# Patient Record
Sex: FEMALE
[De-identification: names, ages, dates, MRNs, and addresses within clinical notes are randomized per-mention and may not be internally consistent; named-entity substitution may affect disease eponyms.]

---

## 2021-11-18 ENCOUNTER — HOSPITAL ENCOUNTER (OUTPATIENT)
Dept: HOSPITAL 97 - ER | Age: 61
Setting detail: OBSERVATION
LOS: 1 days | Discharge: HOME | End: 2021-11-19
Attending: HOSPITALIST | Admitting: HOSPITALIST
Payer: SELF-PAY

## 2021-11-18 VITALS — BODY MASS INDEX: 29.9 KG/M2

## 2021-11-18 DIAGNOSIS — R18.8: Primary | ICD-10-CM

## 2021-11-18 DIAGNOSIS — Z20.822: ICD-10-CM

## 2021-11-18 DIAGNOSIS — R16.0: ICD-10-CM

## 2021-11-18 DIAGNOSIS — B18.2: ICD-10-CM

## 2021-11-18 DIAGNOSIS — I10: ICD-10-CM

## 2021-11-18 LAB
HCT VFR BLD CALC: 38.1 % (ref 36–45)
INR BLD: 1.3
LYMPHOCYTES # SPEC AUTO: 0.7 K/UL (ref 0.7–4.9)
PMV BLD: 11.1 FL (ref 7.6–11.3)
RBC # BLD: 4.09 M/UL (ref 3.86–4.86)

## 2021-11-18 PROCEDURE — 85025 COMPLETE CBC W/AUTO DIFF WBC: CPT

## 2021-11-18 PROCEDURE — 96374 THER/PROPH/DIAG INJ IV PUSH: CPT

## 2021-11-18 PROCEDURE — 74177 CT ABD & PELVIS W/CONTRAST: CPT

## 2021-11-18 PROCEDURE — 99285 EMERGENCY DEPT VISIT HI MDM: CPT

## 2021-11-18 PROCEDURE — 36415 COLL VENOUS BLD VENIPUNCTURE: CPT

## 2021-11-18 PROCEDURE — 80076 HEPATIC FUNCTION PANEL: CPT

## 2021-11-18 PROCEDURE — 89050 BODY FLUID CELL COUNT: CPT

## 2021-11-18 PROCEDURE — 83880 ASSAY OF NATRIURETIC PEPTIDE: CPT

## 2021-11-18 PROCEDURE — 71045 X-RAY EXAM CHEST 1 VIEW: CPT

## 2021-11-18 PROCEDURE — 84484 ASSAY OF TROPONIN QUANT: CPT

## 2021-11-18 PROCEDURE — 88162 CYTOPATH SMEAR OTHER SOURCE: CPT

## 2021-11-18 PROCEDURE — 84439 ASSAY OF FREE THYROXINE: CPT

## 2021-11-18 PROCEDURE — 80048 BASIC METABOLIC PNL TOTAL CA: CPT

## 2021-11-18 PROCEDURE — 93970 EXTREMITY STUDY: CPT

## 2021-11-18 PROCEDURE — 83615 LACTATE (LD) (LDH) ENZYME: CPT

## 2021-11-18 PROCEDURE — 82945 GLUCOSE OTHER FLUID: CPT

## 2021-11-18 PROCEDURE — 84157 ASSAY OF PROTEIN OTHER: CPT

## 2021-11-18 PROCEDURE — 96375 TX/PRO/DX INJ NEW DRUG ADDON: CPT

## 2021-11-18 PROCEDURE — 93005 ELECTROCARDIOGRAM TRACING: CPT

## 2021-11-18 PROCEDURE — 49083 ABD PARACENTESIS W/IMAGING: CPT

## 2021-11-18 PROCEDURE — 80061 LIPID PANEL: CPT

## 2021-11-18 PROCEDURE — 84478 ASSAY OF TRIGLYCERIDES: CPT

## 2021-11-18 PROCEDURE — 84443 ASSAY THYROID STIM HORMONE: CPT

## 2021-11-18 PROCEDURE — 82140 ASSAY OF AMMONIA: CPT

## 2021-11-18 PROCEDURE — 85730 THROMBOPLASTIN TIME PARTIAL: CPT

## 2021-11-18 PROCEDURE — 87070 CULTURE OTHR SPECIMN AEROBIC: CPT

## 2021-11-18 PROCEDURE — 85610 PROTHROMBIN TIME: CPT

## 2021-11-18 PROCEDURE — 83735 ASSAY OF MAGNESIUM: CPT

## 2021-11-18 PROCEDURE — 74183 MRI ABD W/O CNTR FLWD CNTR: CPT

## 2021-11-19 VITALS — TEMPERATURE: 97.9 F | DIASTOLIC BLOOD PRESSURE: 58 MMHG | SYSTOLIC BLOOD PRESSURE: 120 MMHG

## 2021-11-19 VITALS — OXYGEN SATURATION: 96 %

## 2021-11-19 LAB
ALBUMIN SERPL BCP-MCNC: 2.1 G/DL (ref 3.4–5)
ALP SERPL-CCNC: 134 U/L (ref 45–117)
ALT SERPL W P-5'-P-CCNC: 67 U/L (ref 12–78)
AST SERPL W P-5'-P-CCNC: 88 U/L (ref 15–37)
BUN BLD-MCNC: 22 MG/DL (ref 7–18)
GLUCOSE SERPLBLD-MCNC: 101 MG/DL (ref 74–106)
HDLC SERPL-MCNC: 29 MG/DL (ref 40–60)
LDLC SERPL CALC-MCNC: 75 MG/DL (ref ?–130)
MAGNESIUM SERPL-MCNC: 2 MG/DL (ref 1.8–2.4)
NT-PROBNP SERPL-MCNC: 232 PG/ML (ref ?–125)
POTASSIUM SERPL-SCNC: 3.5 MMOL/L (ref 3.5–5.1)
TROPONIN (EMERG DEPT USE ONLY): < 0.02 NG/ML (ref 0–0.04)
TSH SERPL DL<=0.05 MIU/L-ACNC: 2.29 UIU/ML (ref 0.36–3.74)

## 2021-11-19 PROCEDURE — 0W9G3ZX DRAINAGE OF PERITONEAL CAVITY, PERCUTANEOUS APPROACH, DIAGNOSTIC: ICD-10-PCS

## 2021-11-19 NOTE — RAD REPORT
EXAM DESCRIPTION:  US - Paracentesis Proc Guidance - 11/19/2021 8:29 am

 

CLINICAL HISTORY:  Significant ascites, Hep C

Ascites

 

COMPARISON:  No comparisons

 

FINDINGS:  Informed consent was obtained and time-out was performed.

 

Patient's abdomen was prepped and draped in the usual sterile fashion. 1% lidocaine was used for loca
l anesthetic purposes.

 

A small skin incision was made. A paracentesis catheter was guided into the peroneal cavity under son
ographic guidance.

 

A small amount of fluid was sent for requested lab studies. A large volume paracentesis was performed
.

 

The patient tolerated the procedure well.

 

Patient was administered IV albumin per protocol following the procedure.

 

IMPRESSION:  Successful ultrasound-guided paracentesis.

## 2021-11-19 NOTE — P.HP
Certification for Inpatient


Patient admitted to: Observation


With expected LOS: <2 Midnights


Patient will require the following post-hospital care: None


Practitioner: I am a practitioner with admitting privileges, knowledge of 

patient current condition, hospital course, and medical plan of care.


Services: Services provided to patient in accordance with Admission requirements

found in Title 42 Section 412.3 of the Code of Federal Regulations





Patient History


Date of Service: 21


Primary Care Provider: KeaauMannie Ramsay


Reason for admission: Ascites


History of Present Illness: 


61-year-old  female with history of hepatitis C, hypertension presents 

emergency department for abdominal swelling.  Patient reports to diagnosed with 

hepatitis C approximately 2 years ago, has noticed increased welling to the 

abdomen over the course of last 6 months, daughter reports significant worsening

over the course of last few days.  Patient noted to have severe abdominal 

swelling, moderately tense ascites with some dyspnea related.  Patient was 

evaluated in the emergency department labs were significant for chloride 108 GFR

60 9T bili 1.4D bili 0.8 AST 88 alk phos 134  albumin 2.1.  CT abdomen 

pelvis demonstrated massive ascites patient to at least for underlying hepatic 

masses the largest of which inferiorly measures 4.3 cm, no biliary dilatation.  

ED provider wishes to admit for further evaluation and management/paracentesis.








- Past Medical/Surgical History


-: Hepatitis C


-: Hypertension


-: Cholecystectomy


-: 


Psychosocial/ Personal History: Patient lives at home with her son





- Family History


  ** Mother


-: Heart disease





- Social History


Smoking Status: Never smoker


Counseled patient to stop smoking for: less than 10 minutes


Alcohol use: No


CD- Drugs: No


Caffeine use: Yes


Place of Residence: Home





Review of Systems


Respiratory: Shortness of Breath


Gastrointestinal: Abdominal Pain, Distention





Physical Examination





- Physical Exam


General: Alert, In no apparent distress, Oriented x3


HEENT: Atraumatic


Neck: Supple


Respiratory: Clear to auscultation bilaterally


Cardiovascular: No edema, Normal S1 S2


Capillary refill: <2 Seconds


Gastrointestinal: Hypoactive, Distended, Ascites


Musculoskeletal: No contractures, No erythema


Integumentary: No significant lesion, No tenderness/swelling, No erythema


Neurological: Normal speech, Normal strength at 5/5 x4 extr, Normal tone





- Studies


Laboratory Data (last 24 hrs)





21 23:26: PT 15.0 H, INR 1.30, APTT 30.0


21 23:26: WBC 6.20, Hgb 12.4, Hct 38.1, Plt Count 152


21 23:26: Sodium 138, Potassium 3.5, BUN 22 H, Creatinine 0.84, Glucose 

101, Magnesium 2.0, Total Bilirubin 1.4 H, AST 88 H, ALT 67, Alkaline 

Phosphatase 134 H








Assessment and Plan





- Plan


Assessment:


Abdominal pain, significant abdominal ascites secondary to chronic hepatitis C


Hypertension





Plan:


Abdominal pain, significant abdominal ascites secondary to chronic hepatitis C 

with newly found hepatic masses: Continue medications including prolactin, 

lactulose, furosemide.  Will obtain ammonia level.  Will attempt to arrange for 

ultrasound-guided abdominal paracentesis with fluid analysis including cell 

count, cytology, culture and sensitivity, LDH, glucose, triglycerides, Gram 

stain.  We will also administer albumin 25% 10 g/L removed.  MRI also ordered to

further evaluate hepatic masses.  Patient counseled on need for follow-up with 

GI/hepatology on outpatient basis for further care.


Hypertension: Obtain and continue home medications.





DVT PPX: SCDs


Code status: Full code


Discharge Plan: Home


Plan to discharge in: 24 Hours





- Advance Directives


Does patient have a Living Will: No


Does patient have a Durable POA for Healthcare: No





- Code Status/Comfort Care


Code Status Assessed: Yes (Full code )


Critical Care: No


Time Spent Managing Pts Care (In Minutes): 55

## 2021-11-19 NOTE — EDPHYS
Physician Documentation                                                                           

 [facility]                                                                 

Name: Jocelynn Mathias                                                                                

Age: 61 yrs                                                                                       

Sex: Female                                                                                       

: 1960                                                                                   

MRN: [id number]                                                                                   

Arrival Date: 2021                                                                          

Time: 21:25                                                                                       

Account#: [id number]                                                                            

Bed 13                                                                                            

Private MD:                                                                                       

ED Physician Filippo Lyman                                                                      

HPI:                                                                                              

                                                                                             

23:20 This 61 yrs old Female presents to ER via Wheelchair with complaints of Abdominal       cp  

      Swelling, Abdominal Pain.                                                                   

23:20 The patient presents with abdominal pain that is diffuse, abdominal distention that is  cp  

      diffuse.                                                                                    

23:20 Onset: The symptoms/episode began/occurred 2 year(s) ago, and became worse 2 day(s) ago.cp  

23:20 The symptoms do not radiate. Associated signs and symptoms: Pertinent positives:        cp  

      shortness of breath, swelling of lower legs, Pertinent negatives: chest pain,               

      constipation, diarrhea, fever, vomiting. The symptoms are described as constant.            

                                                                                                  

Historical:                                                                                       

- Allergies:                                                                                      

21:57 No Known Allergies;                                                                     ld1 

- Home Meds:                                                                                      

                                                                                             

00:15 Potassium Chloride Oral [Active]; Hydralazine Oral [Active]; Furosemide Oral [Active];  vg1 

      Spironolactone Oral [Active];                                                               

- PMHx:                                                                                           

                                                                                             

21:57 hepatitis; Hypertensive disorder;                                                       ld1 

- PSHx:                                                                                           

21:57  section; Cholecystectomy;                                                      ld1 

                                                                                                  

- Immunization history:: Adult Immunizations up to date, Client reports receiving the             

  2nd dose of the Covid vaccine.                                                                  

- Social history:: Smoking status: Patient denies any tobacco usage or history of.                

  Patient/guardian denies using alcohol.                                                          

                                                                                                  

                                                                                                  

ROS:                                                                                              

23:30 Constitutional: Negative for body aches, chills, fever, poor PO intake.                 cp  

23:30 Eyes: Negative for injury, pain, redness, and discharge.                                cp  

23:30 ENT: Negative for ear pain, sore throat, difficulty swallowing, difficulty handling         

      secretions.                                                                                 

23:30 Cardiovascular: Positive for edema, Negative for chest pain.                                

23:30 Respiratory: Negative for cough, shortness of breath, wheezing.                             

23:30 Abdomen/GI: Positive for abdominal pain, abdominal distension, Negative for diarrhea,       

      constipation, black/tarry stool, rectal bleeding.                                           

23:30 Back: Negative for injury or acute deformity.                                               

23:30 Skin: Negative for cellulitis, rash.                                                        

23:30 Neuro: Negative for altered mental status, headache, weakness.                              

23:30 All other systems are negative.                                                             

                                                                                                  

Exam:                                                                                             

23:33 Constitutional: The patient appears in no acute distress, alert, awake,                 cp  

      non-diaphoretic, well developed, well nourished, in obvious pain, uncomfortable.            

23:33 Head/Face:  Normocephalic, atraumatic.                                                  cp  

23:33 Eyes: Periorbital structures: appear normal, Conjunctiva: normal, no exudate, no            

      injection, Sclera: no appreciated abnormality, Lids and lashes: appear normal,              

      bilaterally.                                                                                

23:33 ENT: External ear(s): are unremarkable, Nose: is normal, Mouth: Lips: moist, Oral           

      mucosa: moist, Posterior pharynx: Airway: no evidence of obstruction, patent.               

23:33 Neck: ROM/movement: is normal, is supple, without pain, no range of motions                 

      limitations, no nuchal rigidity.                                                            

23:33 Chest/axilla: Inspection: normal, Palpation: is normal, no crepitus, no tenderness.         

23:33 Cardiovascular: Rate: tachycardic, Rhythm: regular, Edema: ankle edema, that is             

      moderate, JVD: is not appreciated.                                                          

23:33 Respiratory: the patient does not display signs of respiratory distress,  Respirations:     

      labored breathing, is not present, shallow respirations, that is mild, Breath sounds:       

      are clear throughout, no decreased breath sounds, no stridor, no wheezing.                  

23:33 Abdomen/GI: Inspection: distension, that is severe, in the abdomen diffusely, Bowel         

      sounds: active, all quadrants, Palpation: soft, in all quadrants, severe abdominal          

      tenderness, in all quadrants, rebound tenderness, is not appreciated, voluntary             

      guarding, is elicited in all quadrants, Hernia: noted in the  umbilical area,               

      incarceration, is not appreciated.                                                          

23:33 Back: CVA tenderness, is absent.                                                            

23:33 Skin: cellulitis, is not appreciated, no rash present.                                      

23:33 Neuro: Orientation: to person, place \T\ time. Mentation: is normal, Motor: moves all       

      fours, strength is normal, Sensation: is normal.                                            

23:40 ECG was reviewed by the Attending Physician.                                            cp  

                                                                                                  

Vital Signs:                                                                                      

21:54  / 86; Pulse 108; Resp 20; Temp 98.6(TE); Pulse Ox 99% on R/A; Weight 79.38 kg;   ld1 

      Height 5 ft. 4 in. (162.56 cm); Pain 10/10;                                                 

23:19  / 82; Pulse 107; Resp 24; Pulse Ox 100% ;                                        vg1 

                                                                                             

00:00  / 78; Pulse 99; Resp 20; Pulse Ox 100% ;                                         fu  

01:15  / 75; Pulse 102; Resp 22; Pulse Ox 100% on R/A;                                  fu  

02:15  / 78; Pulse 104; Resp 20; Pulse Ox 98% on R/A; Pain 7/10;                        fu  

03:45  / 79; Pulse 106; Resp 20; Temp 98.3; Pulse Ox 99% on R/A; Pain 6/10;             fu  

                                                                                             

21:54 Body Mass Index 30.04 (79.38 kg, 162.56 cm)                                             ld1 

                                                                                                  

MDM:                                                                                              

                                                                                             

23:14 Patient medically screened.                                                               

                                                                                             

01:50 Data reviewed: vital signs, nurses notes, lab test result(s), EKG, radiologic studies,  cp  

      CT scan, plain films, ultrasound.                                                           

01:50 Test interpretation: by ED physician or midlevel provider: ECG, plain radiologic        cp  

      studies. Counseling: I had a detailed discussion with the patient and/or guardian           

      regarding: the historical points, exam findings, and any diagnostic results supporting      

      the discharge/admit diagnosis, lab results, radiology results, the need for further         

      work-up and treatment in the hospital. Response to treatment: the patient's symptoms        

      have mildly improved after treatment, and as a result, I will admit patient. Physician      

      consultation: El Rodrigues was called at 01:50, was contacted at 01:50, regarding             

      admission, to the telemetry unit. patient's condition, and will see patient in ED,          

      shortly.                                                                                    

                                                                                                  

                                                                                             

23:15 Order name: Basic Metabolic Panel; Complete Time: 00:05                                 cp  

                                                                                             

00:05 Interpretation: Normal except: ; BUN 22; GFR 69.                                    

                                                                                             

23:15 Order name: CBC with Diff; Complete Time: 00:05                                         cp  

                                                                                             

00:05 Interpretation: Normal except: YE% 76.2; LYM% 11.4.                                    cp  

                                                                                             

23:15 Order name: LFT's; Complete Time: 00:05                                                   

                                                                                             

03:17 Interpretation: Normal except: AST 88; ; BILIT 1.4; BILID 0.8; TP 8.7; ALB 2.1;  cp  

      GLOB 6.6; A/G 0.3.                                                                          

                                                                                             

23:15 Order name: Magnesium; Complete Time: 00:05                                             cp  

                                                                                             

23:15 Order name: NT PRO-BNP; Complete Time: 00:05                                              

                                                                                             

23:15 Order name: PT-INR; Complete Time: 00:05                                                  

                                                                                             

23:15 Order name: Troponin (emerg Dept Use Only); Complete Time: 00:05                          

                                                                                             

23:15 Order name: XRAY Chest (1 view)                                                           

                                                                                             

23:15 Order name: Ptt, Activated; Complete Time: 00:05                                          

                                                                                             

00:08 Order name: CT Abd/Pelvis - IV Contrast Only                                              

                                                                                             

00:11 Order name: US Extremity Venous W Compression Nick                                       cp  

                                                                                             

01:07 Order name: COVID-19 (Coronavirus) Document "Date of Onset" if Symptomatic                

                                                                                             

01:50 Order name: SARS-COV-2 RT PCR; Complete Time: 03:16                                     EDMS

                                                                                             

23:15 Order name: EKG; Complete Time: 23:16                                                   cp  

                                                                                             

23:15 Order name: Cardiac monitoring; Complete Time: 23:44                                      

                                                                                             

23:15 Order name: EKG - Nurse/Tech; Complete Time: 23:44                                      cp  

                                                                                             

23:15 Order name: IV Saline Lock; Complete Time: 23:26                                        cp  

                                                                                             

23:15 Order name: Labs collected and sent; Complete Time: 23:26                                 

                                                                                             

23:15 Order name: O2 Per Protocol; Complete Time: 23:21                                         

                                                                                             

23:15 Order name: O2 Sat Monitoring; Complete Time: 23:21                                     cp  

                                                                                                  

EC/18                                                                                             

23:40 Rate is 106 beats/min. Rhythm is regular. MS interval is normal. QRS interval is        cp  

      normal. QT interval is normal. Interpreted by me. Reviewed by me.                           

                                                                                                  

Administered Medications:                                                                         

23:40 Drug: Zofran (Ondansetron) 4 mg Route: IVP; Site: right antecubital;                    vg1 

                                                                                             

01:05 Follow up: Response: No adverse reaction                                                  

                                                                                             

23:42 Drug: morphine 4 mg {Note: rass 1.} Route: IVP; Site: right antecubital;                vg1 

                                                                                             

00:30 Follow up: Response: Pain is decreased                                                  fu  

01:26 Drug: Lasix (furosemide) 40 mg Route: IVP; Site: right antecubital;                     fu  

02:49 Follow up: Urine output 800 ml                                                          fu  

                                                                                                  

                                                                                                  

Disposition:                                                                                      

06:18 Co-signature as Attending Physician, Filippo Lyman MD.                                 [id number] 

                                                                                                  

Disposition Summary:                                                                              

21 01:57                                                                                    

Hospitalization Ordered                                                                           

      Hospitalization Status: Observation                                                     cp  

      Provider: Ash Weiss cp  

      Location: Telemetry/MedSurg (observation)                                               cp  

      Condition: Stable                                                                       cp  

      Problem: an ongoing problem                                                             cp  

      Symptoms: have improved                                                                 cp  

      Bed/Room Type: Standard                                                                 cp  

      Room Assignment: 208(21 03:41)                                                    eb1 

      Diagnosis                                                                                   

        - Unspecified cirrhosis of liver                                                      cp  

        - Abdominal pain, unspecified                                                         cp  

      Forms:                                                                                      

        - Medication Reconciliation Form                                                      cp  

        - SBAR form                                                                           cp  

Signatures:                                                                                       

Dispatcher [employer]                           EDMS                                                 

Vinny Mosley PA PA   cp                                                   

Max Nash RN                      [facility]                                                      

Glory Rodriguez RN                     RN   [facility]                                                  

Latrice Radford RN RN   [facility]                                                  

Filippo Lyman MD MD   [facility]                                                  

Claudia Peralta RN                     RN   1                                                  

                                                                                                  

Corrections: (The following items were deleted from the chart)                                    

00:16  21:57 Home Meds: None; ld1                                                        1 

                                                                                             

01:50 01:08 CORONAVIRUS ordered. EDMS                                                         EDMS

03:41 01:57 cp                                                                                eb1 

                                                                                                  

**************************************************************************************************

## 2021-11-19 NOTE — RAD REPORT
EXAM DESCRIPTION:  MRI - Abdomen WWo Cont - 11/19/2021 8:14 am

 

CLINICAL HISTORY:  Eval liver masses

 

COMPARISON:  Abdomen   Pelvis W Contrast dated 11/19/2021

 

FINDINGS:  Cirrhotic liver morphology. There are several lesions in the liver which are intrinsically
 T1 hyperintense, hypervascular, and washout consistent which is concerning for the presence of multi
focal hepatocellular carcinoma. The largest lesion is in the inferior right hepatic lobe measuring 4.
1 cm. The T2 sequences are limited and is difficult to tell if T2 hyperintense. There is a mildly exo
phytic lesion in the superior and lateral aspect of segment 2 of the liver measuring 2.9 cm. There is
 a lesion in the dome measuring 1.8 cm which washes out and is suspicious. In the posterior aspect of
 segment 5, there is a 2.7 and 2.4 cm lesion which washes out. The portal vein is patent.

 

Large volume of ascites. No pancreatic masses identified. Spleen is within normal limits. No focal re
nal masses are seen. Cholecystectomy.

 

IMPRESSION:  At least 5 lesions are present in the liver including the left and right hepatic lobes t
hat are suspicious for multifocal/multicentric hepatocellular carcinoma. The largest lesion measures 
4.1 cm.

## 2021-11-19 NOTE — EKG
Test Date:    2021-11-18               Test Time:    23:34:42

Technician:   LOVE                                    

                                                     

MEASUREMENT RESULTS:                                       

Intervals:                                           

Rate:         106                                    

ID:           [id number]                                    

QRSD:         96                                     

QT:           338                                    

QTc:          448                                    

Axis:                                                

P:            38                                     

ID:           [id number]                                    

QRS:          7                                      

T:            123                                    

                                                     

INTERPRETIVE STATEMENTS:                                       

                                                     

Sinus tachycardia with premature atrial complexes

Left ventricular hypertrophy with repolarization abnormality

Abnormal ECG

No previous ECG available for comparison



Electronically Signed On 11-19-21 13:16:06 CST by Cain Person

## 2021-11-19 NOTE — RAD REPORT
EXAM DESCRIPTION:  CT - Abdomen   Pelvis W Contrast - 11/19/2021 6:25 am

 

CLINICAL HISTORY:  Abdominal distention;Abd painCOMPARISON:  None.

 

TECHNIQUE:  CT ABDOMEN PELVIS WITH IV CONTRAST on 11/19/2021 12:08 AM CST This exam was performed acc
ording to our departmental dose-optimization program, which includes automated exposure control, adju
stment of the mA and/or kV according to patient size and/or use of iterative reconstruction technique
.

 

FINDINGS:  There is mostly right basilar atelectasis.Abdomen: Liver is severely cirrhotic containing 
at least four masses within the right lobe, the largest of which inferiorly measures 4.3 cm. There is
 no biliary dilatation. Cholecystectomy was performed. There is massive ascites. The pancreas and spl
een are normal in appearance. The adrenal glands and kidneys are unremarkable. Abdominal aorta is mod
erately calcified without aneurysm. There is no free air. There is no retroperitoneal adenopathy. The
re is a large fluid containing umbilical hernia. There is diffuse body wall anasarca.Pelvis: There is
 no bowel obstruction. Urinary bladder is unremarkable. There is large amount of free pelvic fluid. U
terus is normal in size. Appendix is normal.Skeleton: There are no acute osseous findings. No suspici
ous bony lesions.

 

IMPRESSION:  Severe hepatic cirrhosis with at least four underlying hepatic masses, which should be f
urther assessed with dedicated MRI. Severe ascites.

 

Electronically signed by:   Alexandre Pinon MD   11/19/2021 1:51 AM CST Workstation: [id number]

 

 

Due to temporary technical issues with the PACS/Fluency reporting system, reports are being signed by
 the in house radiologist without review as a courtesy to ensure prompt reporting. The interpreting r
adiologist is fully responsible for the content of the report.

## 2021-11-19 NOTE — RAD REPORT
EXAM DESCRIPTION:  US - Extrem Venous W Compress Nick - 11/19/2021 12:50 am

 

CLINICAL HISTORY:  Bilateral leg DVT

 

COMPARISON:  None.

 

TECHNIQUE:  Real-time sonographic evaluation of the bilateral lower extremity deep venous systems was
 performed.

 

FINDINGS:  Normal compressibility, flow augmentation, phasic flow and spontaneous flow is identified 
in both the left and right lower extremity deep venous systems. No intraluminal filling defects seen.


 

IMPRESSION:  No DVT in either lower extremity.

## 2021-11-19 NOTE — ER
Nurse's Notes                                                                                     

 [facility]                                                                 

Name: Jocelynn Mathias                                                                                

Age: 61 yrs                                                                                       

Sex: Female                                                                                       

: 1960                                                                                   

MRN: [id number]                                                                                   

Arrival Date: 2021                                                                          

Time: 21:25                                                                                       

Account#: [id number]                                                                            

Bed 13                                                                                            

Private MD:                                                                                       

Diagnosis: Unspecified cirrhosis of liver;Abdominal pain, unspecified                             

                                                                                                  

Presentation:                                                                                     

                                                                                             

21:54 Chief complaint: Patient states: My stomach is swelling really bad, This began about 2  ld1 

      years ago. Pt reports it has something to do with her liver. Swelling increasing            

      dramatically over the past two days. Coronavirus screen: At this time, the client does      

      not indicate any symptoms associated with coronavirus-19. Ebola Screen: No symptoms or      

      risks identified at this time. Initial Sepsis Screen: Does the patient meet any 2           

      criteria? No. Patient's initial sepsis screen is negative. Does the patient have a          

      suspected source of infection? No. Patient's initial sepsis screen is negative. Risk        

      Assessment: Do you want to hurt yourself or someone else? Patient reports no desire to      

      harm self or others. Onset of symptoms was 2021 at 21:57.                      

21:54 Method Of Arrival: Wheelchair                                                           ld1 

21:54 Acuity: CALEB 3                                                                           ld1 

                                                                                                  

Triage Assessment:                                                                                

21:57 General: Appears in no apparent distress. uncomfortable, Behavior is calm, cooperative, ld1 

      appropriate for age. Pain: Complains of pain in abdomen Pain does not radiate. Pain         

      currently is 10 out of 10 on a pain scale. Quality of pain is described as throbbing,       

      Pain began suddenly, Is continuous. Neuro: Level of Consciousness is awake, alert,          

      obeys commands, Oriented to person, place, time, situation, Appropriate for age.            

      Cardiovascular: Capillary refill < 3 seconds Patient's skin is warm and dry.                

      Respiratory: Airway is patent Respiratory effort is even, unlabored, Respiratory            

      pattern is regular, symmetrical. GI: Abdomen is round distended, Reports lower              

      abdominal pain, upper abdominal pain, bloating. : No signs and/or symptoms were           

      reported regarding the genitourinary system. Derm: No signs and/or symptoms reported        

      regarding the dermatologic system. Derm: No signs and/or symptoms reported regarding        

      the dermatologic system.                                                                    

                                                                                                  

Historical:                                                                                       

- Allergies:                                                                                      

:57 No Known Allergies;                                                                     ld1 

- Home Meds:                                                                                      

                                                                                             

00:15 Potassium Chloride Oral [Active]; Hydralazine Oral [Active]; Furosemide Oral [Active];  vg1 

      Spironolactone Oral [Active];                                                               

- PMHx:                                                                                           

                                                                                             

21:57 hepatitis; Hypertensive disorder;                                                       ld1 

- PSHx:                                                                                           

21:57  section; Cholecystectomy;                                                      ld1 

                                                                                                  

- Immunization history:: Adult Immunizations up to date, Client reports receiving the             

  2nd dose of the Covid vaccine.                                                                  

- Social history:: Smoking status: Patient denies any tobacco usage or history of.                

  Patient/guardian denies using alcohol.                                                          

                                                                                                  

                                                                                                  

Screenin:19 Abuse screen: Denies threats or abuse. Nutritional screening: No deficits noted.        vg1 

      Tuberculosis screening: No symptoms or risk factors identified. Fall Risk No fall in        

      past 12 months (0 pts). No secondary diagnosis (0 pts). IV access (20 points).              

      Ambulatory Aid- None/Bed Rest/Nurse Assist (0 pts). Gait- Normal/Bed Rest/Wheelchair (0     

      pts) Mental Status- Oriented to own ability (0 pts). Total Quezada Fall Scale indicates       

      No Risk (0-24 pts).                                                                         

                                                                                                  

Assessment:                                                                                       

23:15 General: Appears in no apparent distress. uncomfortable, Behavior is calm, cooperative. vg1 

      Pain: Complains of pain in abdomen Pain currently is 10 out of 10 on a pain scale. Pain     

      began 2-3 days ago. Neuro: Level of Consciousness is awake, alert, obeys commands,          

      Oriented to person, place, time, situation. Cardiovascular: Patient's skin is warm and      

      dry. Respiratory: Airway is patent Respiratory effort is even, labored, Respiratory         

      pattern is tachypnea. GI: Abdomen is round distended, noted to have ascites, Bowel          

      sounds hypoactive in right upper quadrant, left upper quadrant, right lower quadrant        

      and left lower quadrant Abd is non tender X 4 quads Patient currently denies diarrhea,      

      nausea, vomiting. : No signs and/or symptoms were reported regarding the                  

      genitourinary system. EENT: No signs and/or symptoms were reported regarding the EENT       

      system. Derm: Skin is intact. Musculoskeletal: Circulation, motion, and sensation           

      intact. Swelling present in right leg.                                                      

23:37 Reassessment: Received VO from Melany MAN to administer 4 mg of Zofran IVP x1 and Morphine vg1 

      4 mg IVP x1.                                                                                

                                                                                             

00:24 Reassessment: Patient and/or family updated on plan of care and expected duration. Pain fu  

      level reassessed. Patient is alert, oriented x 3, equal unlabored respirations, skin        

      warm/dry/pink.                                                                              

01:00 Reassessment: Patient is alert, oriented x 3, equal unlabored respirations, skin        fu  

      warm/dry/pink. patient resting in bed, mild SOB noted.                                      

02:48 Reassessment: Patient and/or family updated on plan of care and expected duration. Pain fu  

      level reassessed. Patient is alert, oriented x 3, equal unlabored respirations, skin        

      warm/dry/pink. Patient voided in bedside commode.                                           

04:12 Reassessment: Patient and/or family updated on plan of care and expected duration. Pain fu  

      level reassessed. Patient is alert, oriented x 3, equal unlabored respirations, skin        

      warm/dry/pink.                                                                              

                                                                                                  

Vital Signs:                                                                                      

                                                                                             

21:54  / 86; Pulse 108; Resp 20; Temp 98.6(TE); Pulse Ox 99% on R/A; Weight 79.38 kg;   ld1 

      Height 5 ft. 4 in. (162.56 cm); Pain 10/10;                                                 

23:19  / 82; Pulse 107; Resp 24; Pulse Ox 100% ;                                        vg1 

                                                                                             

00:00  / 78; Pulse 99; Resp 20; Pulse Ox 100% ;                                         fu  

01:15  / 75; Pulse 102; Resp 22; Pulse Ox 100% on R/A;                                  fu  

02:15  / 78; Pulse 104; Resp 20; Pulse Ox 98% on R/A; Pain 7/10;                        fu  

03:45  / 79; Pulse 106; Resp 20; Temp 98.3; Pulse Ox 99% on R/A; Pain 6/10;             fu  

                                                                                             

21:54 Body Mass Index 30.04 (79.38 kg, 162.56 cm)                                             ld1 

                                                                                                  

ED Course:                                                                                        

                                                                                             

21:25 Patient arrived in ED.                                                                  ja2 

21:57 Triage completed.                                                                       ld1 

21:57 Arm band placed on left wrist.                                                          ld1 

23:00 Vinny Mosley PA is PHCP.                                                                cp  

23:00 Filippo Lyman MD is Attending Physician.                                             cp  

23:01 Latrice Radford, RAUL is Primary Nurse.                                                  vg1 

23:19 Bed in low position. Call light in reach. Side rails up X2. Adult w/ patient.           vg1 

23:28 Inserted saline lock: 20 gauge in right antecubital area, using aseptic technique.      ds4 

      Blood collected.                                                                            

23:34 XRAY Chest (1 view) In Process Unspecified.                                             EDMS

                                                                                             

00:29 Primary Nurse role handed off by Latrice Radford, RAUL                                   fu  

00:29 Max Nash, RN is Primary Nurse.                                                    fu  

00:50 US Extremity Venous W Compression Nick In Process Unspecified.                           EDMS

01:17 CT Abd/Pelvis - IV Contrast Only In Process Unspecified.                                EDMS

01:54 Ash Weiss is Hospitalizing Provider.                                               cp  

02:26 COVID swab sent to lab.                                                                 fu  

02:26 SARS-COV-2 RT PCR Sent.                                                                 fu  

02:26 COVID-19 (Coronavirus) Document "Date of Onset" if Symptomatic Sent.                    fu  

03:59 No provider procedures requiring assistance completed. Patient admitted, IV remains in  fu  

      place.                                                                                      

                                                                                                  

Administered Medications:                                                                         

                                                                                             

23:40 Drug: Zofran (Ondansetron) 4 mg Route: IVP; Site: right antecubital;                    1 

                                                                                             

01:05 Follow up: Response: No adverse reaction                                                fu  

                                                                                             

23:42 Drug: morphine 4 mg {Note: rass 1.} Route: IVP; Site: right antecubital;                1 

                                                                                             

00:30 Follow up: Response: Pain is decreased                                                  fu  

01:26 Drug: Lasix (furosemide) 40 mg Route: IVP; Site: right antecubital;                     fu  

02:49 Follow up: Urine output 800 ml                                                          fu  

                                                                                                  

                                                                                                  

Output:                                                                                           

02:49 Urine: 800ml; Total: 800ml.                                                             fu  

02:50 Urine: 800ml (Voided); Total: 1600ml.                                                   fu  

                                                                                                  

Outcome:                                                                                          

01:57 Decision to Hospitalize by Provider.                                                    cp  

04:11 Admitted to Med/surg accompanied by nurse, room 206, Report called to  RAUL Brock      fu  

04:11 Condition: unchanged                                                                        

04:11 Instructed on the need for admit, Demonstrated understanding of instructions.               

04:22 Patient left the ED.                                                                    fu  

                                                                                                  

Signatures:                                                                                       

Dispatcher [employer]                           EDMS                                                 

Maurilio Angel                             ds4                                                  

Vinny Mosley PA PA cp Umadhay, Felix, RN RN                                                      

Latrice Radford RN RN   1                                                  

Claudia Peralta RN                     RN   1                                                  

Maryann Benjamin                                                  

                                                                                                  

Corrections: (The following items were deleted from the chart)                                    

00:16  21:57 Home Meds: None; ld1                                                        vg1 

                                                                                                  

**************************************************************************************************

## 2021-11-19 NOTE — RAD REPORT
EXAM DESCRIPTION:  RAD - Chest Single View - 11/18/2021 11:34 pm

 

CLINICAL HISTORY:  ABDOMINAL DISTENTION

 

COMPARISON:  Abdomen   Pelvis W Contrast dated 11/19/2021

 

FINDINGS:  Lines: None.

Lungs: Low lung volumes but the lungs are otherwise clear.

Pleural: No significant pleural effusions or pneumothorax.

Cardiac: The heart size is within normal limits.

Bones: No acute fractures.

Other:

 

IMPRESSION:  Low lung volumes but no focal airspace disease.

## 2021-11-23 LAB
GLUCOSE PRT-MCNC: 112 MG/DL
LDH PRT-CCNC: 36 U/L (ref ?–63)
PROT PRT-MCNC: <3 G/DL